# Patient Record
Sex: MALE | Race: WHITE | Employment: FULL TIME | URBAN - NONMETROPOLITAN AREA
[De-identification: names, ages, dates, MRNs, and addresses within clinical notes are randomized per-mention and may not be internally consistent; named-entity substitution may affect disease eponyms.]

---

## 2019-06-19 ENCOUNTER — OFFICE VISIT (OUTPATIENT)
Dept: PRIMARY CARE CLINIC | Age: 54
End: 2019-06-19
Payer: COMMERCIAL

## 2019-06-19 VITALS
BODY MASS INDEX: 25.73 KG/M2 | OXYGEN SATURATION: 95 % | HEIGHT: 72 IN | WEIGHT: 190 LBS | RESPIRATION RATE: 16 BRPM | HEART RATE: 71 BPM | DIASTOLIC BLOOD PRESSURE: 80 MMHG | TEMPERATURE: 98 F | SYSTOLIC BLOOD PRESSURE: 118 MMHG

## 2019-06-19 DIAGNOSIS — E78.2 MIXED HYPERLIPIDEMIA: Primary | ICD-10-CM

## 2019-06-19 PROCEDURE — 99213 OFFICE O/P EST LOW 20 MIN: CPT | Performed by: FAMILY MEDICINE

## 2019-06-19 ASSESSMENT — PATIENT HEALTH QUESTIONNAIRE - PHQ9
1. LITTLE INTEREST OR PLEASURE IN DOING THINGS: 0
SUM OF ALL RESPONSES TO PHQ QUESTIONS 1-9: 0
2. FEELING DOWN, DEPRESSED OR HOPELESS: 0
SUM OF ALL RESPONSES TO PHQ QUESTIONS 1-9: 0
SUM OF ALL RESPONSES TO PHQ9 QUESTIONS 1 & 2: 0

## 2019-06-19 NOTE — PROGRESS NOTES
2019     Heron Wiley    : 1965 Sex: male   Age: 62 y.o. Chief Complaint   Patient presents with    Follow-up     no new problems       HPI: This 62y.o. -year-old male  presents today for evaluation and management of his  chronic medical problems. Current medication list reviewed. The patient is tolerating all medications well without adverse events or known side effects. The patient does understand the risk and benefits of the prescribed medications. The patient is up-to-date on all age-appropriate wellness issues. ROS:   Const: Denies changes in appetite, chills, fever, night sweats and weight loss. Eyes:  Denies discharge, a recent change in visual acuity, blurred vision and double vision. ENMT: Denies discharge of the ears, hearing loss, pain of the ears. Denies nasal or sinus symptoms other than stated above. Denies mouth or throat symptoms. CV:  Denies chest pain, dyspnea on exertion, orthopnea, palpitations and PND  Resp: Denies chest pain, cough, SOB and wheezing. GI: Denies abdominal pain, constipation, diarrhea, heartburn, indigestion, nausea and vomiting. : Denies dysuria, frequency, hematuria, nocturia and urgency. Musculo: Denies arthralgias and myalgia  Skin:  Denies lesions, pruritus and rash. Neuro: Denies dizziness, lightheadedness, numbness, tingling and weakness. Psych:  Denies anxiety and depression  Endocrine: Denies anxiety and depression. Hema/Lymph: Denies hematologic symptoms  Allergy/Immuno:  Denies allergic/immunologic symptoms. Pertinent positives reviewed and noted    No current outpatient medications on file. No Known Allergies    History reviewed. No pertinent past medical history.   Social History     Socioeconomic History    Marital status:      Spouse name: Not on file    Number of children: Not on file    Years of education: Not on file    Highest education level: Not on file   Occupational History    Not on file   Social Needs   

## 2021-07-06 ENCOUNTER — OFFICE VISIT (OUTPATIENT)
Dept: PRIMARY CARE CLINIC | Age: 56
End: 2021-07-06
Payer: COMMERCIAL

## 2021-07-06 VITALS
RESPIRATION RATE: 18 BRPM | HEIGHT: 72 IN | BODY MASS INDEX: 26.82 KG/M2 | SYSTOLIC BLOOD PRESSURE: 132 MMHG | WEIGHT: 198 LBS | DIASTOLIC BLOOD PRESSURE: 68 MMHG | HEART RATE: 74 BPM | TEMPERATURE: 98.3 F | OXYGEN SATURATION: 97 %

## 2021-07-06 DIAGNOSIS — Z72.0 TOBACCO ABUSE: ICD-10-CM

## 2021-07-06 DIAGNOSIS — Z13.220 SCREENING CHOLESTEROL LEVEL: ICD-10-CM

## 2021-07-06 DIAGNOSIS — Z12.5 SCREENING FOR PROSTATE CANCER: ICD-10-CM

## 2021-07-06 DIAGNOSIS — Z00.01 ENCOUNTER FOR GENERAL ADULT MEDICAL EXAMINATION WITH ABNORMAL FINDINGS: Primary | ICD-10-CM

## 2021-07-06 DIAGNOSIS — Z98.890 HISTORY OF BACK SURGERY: ICD-10-CM

## 2021-07-06 PROCEDURE — 99396 PREV VISIT EST AGE 40-64: CPT | Performed by: FAMILY MEDICINE

## 2021-07-06 SDOH — ECONOMIC STABILITY: FOOD INSECURITY: WITHIN THE PAST 12 MONTHS, THE FOOD YOU BOUGHT JUST DIDN'T LAST AND YOU DIDN'T HAVE MONEY TO GET MORE.: NEVER TRUE

## 2021-07-06 SDOH — ECONOMIC STABILITY: FOOD INSECURITY: WITHIN THE PAST 12 MONTHS, YOU WORRIED THAT YOUR FOOD WOULD RUN OUT BEFORE YOU GOT MONEY TO BUY MORE.: NEVER TRUE

## 2021-07-06 ASSESSMENT — PATIENT HEALTH QUESTIONNAIRE - PHQ9
SUM OF ALL RESPONSES TO PHQ9 QUESTIONS 1 & 2: 0
SUM OF ALL RESPONSES TO PHQ QUESTIONS 1-9: 0
2. FEELING DOWN, DEPRESSED OR HOPELESS: 0
1. LITTLE INTEREST OR PLEASURE IN DOING THINGS: 0

## 2021-07-06 ASSESSMENT — ENCOUNTER SYMPTOMS
COUGH: 0
GASTROINTESTINAL NEGATIVE: 1

## 2021-07-06 ASSESSMENT — SOCIAL DETERMINANTS OF HEALTH (SDOH): HOW HARD IS IT FOR YOU TO PAY FOR THE VERY BASICS LIKE FOOD, HOUSING, MEDICAL CARE, AND HEATING?: NOT HARD AT ALL

## 2021-07-06 NOTE — PROGRESS NOTES
21  Mike Sosa : 1965 Sex: male  Age: 60 y.o. Assessment and Plan:  Jeanna Lakhani was seen today for new patient. Diagnoses and all orders for this visit:    Encounter for general adult medical examination with abnormal findings  -     Basic Metabolic Panel; Future  -     Hepatic Function Panel; Future  -     CBC Auto Differential; Future  -     Lipid Panel; Future    History of back surgery    BMI 26.0-26.9,adult  -     Basic Metabolic Panel; Future  -     Hepatic Function Panel; Future  -     CBC Auto Differential; Future  -     Lipid Panel; Future    Screening cholesterol level  -     Basic Metabolic Panel; Future  -     Hepatic Function Panel; Future  -     CBC Auto Differential; Future  -     Lipid Panel; Future    Screening for prostate cancer  -     Basic Metabolic Panel; Future  -     Hepatic Function Panel; Future  -     CBC Auto Differential; Future  -     Lipid Panel; Future  -     PSA screening; Future    Tobacco abuse    Patient is overall stable. He will update routine annual blood work at his earliest convenience. Further recommendations pending results. Patient reports that he is up-to-date with colonoscopy through [facility] approximately 2 to 3 years ago. He states he was told he should just follow-up for repeat screening in 10 years. Patient was encouraged to consider smoking cessation and call for patches anytime he feels like he is ready. Lung cancer screening was discussed, and the patient would like to think about this further before committing at this time. Return in about 1 year (around 2022) for Routine Follow Up. Chief Complaint   Patient presents with    New Patient       HPI  Patient is here today to reestablish care with new provider and for annual exam.  He is overall healthy. He does not take any current medications. He reports no history of any significant medical issues.   He does have a remote history of previous back surgery for which he did quite well and has really no residual issues. Patient is a smoker, 1 pack/day. His wife recently quit with patches. He is interested in trying this, but states he is not quite ready as of today. Patient states he previously tried Chantix, but did not like how he felt on the medication. We also made him aware of Wellbutrin for consideration moving forward. Problem list reviewed and updated in full with patient today as necessary. A comprehensive ROS was negative, except as documented above. Review of Systems   Constitutional: Negative for chills and fever. Respiratory: Negative for cough. Notes some SOB with exertion, blames smoking. Mostly stable. Cardiovascular: Negative for palpitations. Stabbing pains with stress. No exertional pain despite good activity. Gastrointestinal: Negative. Genitourinary: Negative. Psychiatric/Behavioral:        Mood has been alright overall        No current outpatient medications on file. No Known Allergies    Pt's past medical and surgical history were updated as necessary today   Pt's family and social history were updated as necessary today    Works at [employer]; job is stressful  , three kids   Smokes, 1ppd     Vitals:    07/06/21 1309   BP: 132/68   Pulse: 74   Resp: 18   Temp: 98.3 °F (36.8 °C)   TempSrc: Temporal   SpO2: 97%   Weight: 198 lb (89.8 kg)   Height: 6' (1.829 m)       Physical Exam  Constitutional:       Appearance: Normal appearance. HENT:      Head: Normocephalic and atraumatic. Eyes:      Conjunctiva/sclera: Conjunctivae normal.   Cardiovascular:      Rate and Rhythm: Normal rate and regular rhythm. Heart sounds: Normal heart sounds. Pulmonary:      Effort: Pulmonary effort is normal.      Breath sounds: Normal breath sounds. Abdominal:      Palpations: Abdomen is soft. Tenderness: There is no abdominal tenderness.    Musculoskeletal:         General: Normal range of motion. Skin:     General: Skin is warm and dry. Neurological:      General: No focal deficit present. Mental Status: He is alert and oriented to person, place, and time.    Psychiatric:         Mood and Affect: Mood normal.         Behavior: Behavior normal.         Seen By:  Ronaldo Adams MD

## 2021-07-09 DIAGNOSIS — Z00.01 ENCOUNTER FOR GENERAL ADULT MEDICAL EXAMINATION WITH ABNORMAL FINDINGS: ICD-10-CM

## 2021-07-09 DIAGNOSIS — Z13.220 SCREENING CHOLESTEROL LEVEL: ICD-10-CM

## 2021-07-09 DIAGNOSIS — Z12.5 SCREENING FOR PROSTATE CANCER: ICD-10-CM

## 2021-07-09 LAB
ALBUMIN SERPL-MCNC: 4.4 G/DL (ref 3.5–5.2)
ALP BLD-CCNC: 81 U/L (ref 40–129)
ALT SERPL-CCNC: 24 U/L (ref 0–40)
ANION GAP SERPL CALCULATED.3IONS-SCNC: 9 MMOL/L (ref 7–16)
AST SERPL-CCNC: 23 U/L (ref 0–39)
BASOPHILS ABSOLUTE: 0.03 E9/L (ref 0–0.2)
BASOPHILS RELATIVE PERCENT: 0.5 % (ref 0–2)
BILIRUB SERPL-MCNC: 0.6 MG/DL (ref 0–1.2)
BILIRUBIN DIRECT: <0.2 MG/DL (ref 0–0.3)
BILIRUBIN, INDIRECT: NORMAL MG/DL (ref 0–1)
BUN BLDV-MCNC: 12 MG/DL (ref 6–20)
CALCIUM SERPL-MCNC: 9.5 MG/DL (ref 8.6–10.2)
CHLORIDE BLD-SCNC: 106 MMOL/L (ref 98–107)
CHOLESTEROL, TOTAL: 220 MG/DL (ref 0–199)
CO2: 24 MMOL/L (ref 22–29)
CREAT SERPL-MCNC: 0.8 MG/DL (ref 0.7–1.2)
EOSINOPHILS ABSOLUTE: 0.22 E9/L (ref 0.05–0.5)
EOSINOPHILS RELATIVE PERCENT: 3.4 % (ref 0–6)
GFR AFRICAN AMERICAN: >60
GFR NON-AFRICAN AMERICAN: >60 ML/MIN/1.73
GLUCOSE BLD-MCNC: 87 MG/DL (ref 74–99)
HCT VFR BLD CALC: 53.8 % (ref 37–54)
HDLC SERPL-MCNC: 40 MG/DL
HEMOGLOBIN: 17.4 G/DL (ref 12.5–16.5)
IMMATURE GRANULOCYTES #: 0.02 E9/L
IMMATURE GRANULOCYTES %: 0.3 % (ref 0–5)
LDL CHOLESTEROL CALCULATED: 148 MG/DL (ref 0–99)
LYMPHOCYTES ABSOLUTE: 1.51 E9/L (ref 1.5–4)
LYMPHOCYTES RELATIVE PERCENT: 23.1 % (ref 20–42)
MCH RBC QN AUTO: 31.6 PG (ref 26–35)
MCHC RBC AUTO-ENTMCNC: 32.3 % (ref 32–34.5)
MCV RBC AUTO: 97.6 FL (ref 80–99.9)
MONOCYTES ABSOLUTE: 0.63 E9/L (ref 0.1–0.95)
MONOCYTES RELATIVE PERCENT: 9.6 % (ref 2–12)
NEUTROPHILS ABSOLUTE: 4.14 E9/L (ref 1.8–7.3)
NEUTROPHILS RELATIVE PERCENT: 63.1 % (ref 43–80)
PDW BLD-RTO: 13.4 FL (ref 11.5–15)
PLATELET # BLD: 176 E9/L (ref 130–450)
PMV BLD AUTO: 11.8 FL (ref 7–12)
POTASSIUM SERPL-SCNC: 4.8 MMOL/L (ref 3.5–5)
PROSTATE SPECIFIC ANTIGEN: 0.49 NG/ML (ref 0–4)
RBC # BLD: 5.51 E12/L (ref 3.8–5.8)
SODIUM BLD-SCNC: 139 MMOL/L (ref 132–146)
TOTAL PROTEIN: 7.3 G/DL (ref 6.4–8.3)
TRIGL SERPL-MCNC: 158 MG/DL (ref 0–149)
VLDLC SERPL CALC-MCNC: 32 MG/DL
WBC # BLD: 6.6 E9/L (ref 4.5–11.5)

## 2021-08-19 ENCOUNTER — OFFICE VISIT (OUTPATIENT)
Dept: PRIMARY CARE CLINIC | Age: 56
End: 2021-08-19
Payer: COMMERCIAL

## 2021-08-19 VITALS
BODY MASS INDEX: 26.18 KG/M2 | OXYGEN SATURATION: 97 % | HEART RATE: 76 BPM | DIASTOLIC BLOOD PRESSURE: 71 MMHG | SYSTOLIC BLOOD PRESSURE: 120 MMHG | TEMPERATURE: 97.6 F | WEIGHT: 193 LBS

## 2021-08-19 DIAGNOSIS — R19.6 HALITOSIS: Primary | ICD-10-CM

## 2021-08-19 DIAGNOSIS — D58.2 ELEVATED HEMOGLOBIN (HCC): ICD-10-CM

## 2021-08-19 DIAGNOSIS — E78.2 MIXED HYPERLIPIDEMIA: ICD-10-CM

## 2021-08-19 PROCEDURE — 99214 OFFICE O/P EST MOD 30 MIN: CPT | Performed by: FAMILY MEDICINE

## 2021-08-19 RX ORDER — CETIRIZINE HYDROCHLORIDE 10 MG/1
10 TABLET ORAL DAILY
Qty: 30 TABLET | Refills: 3 | Status: SHIPPED
Start: 2021-08-19 | End: 2022-09-13 | Stop reason: ALTCHOICE

## 2021-08-19 RX ORDER — FAMOTIDINE 20 MG/1
20 TABLET, FILM COATED ORAL 2 TIMES DAILY
Qty: 60 TABLET | Refills: 3 | Status: SHIPPED
Start: 2021-08-19 | End: 2022-09-13 | Stop reason: ALTCHOICE

## 2021-08-19 ASSESSMENT — ENCOUNTER SYMPTOMS: RESPIRATORY NEGATIVE: 1

## 2021-08-19 NOTE — PROGRESS NOTES
21  Miki Barton : 1965 Sex: male  Age: 60 y.o. Assessment and Plan:  Trenton Murphy was seen today for discuss labs. Diagnoses and all orders for this visit:    Halitosis  -     famotidine (PEPCID) 20 MG tablet; Take 1 tablet by mouth 2 times daily  -     cetirizine (ZYRTEC) 10 MG tablet; Take 1 tablet by mouth daily    Mixed hyperlipidemia    Elevated hemoglobin (HCC)  -     CBC; Future    Patient will trial both antihistamine and H2 blocker to see if either improve his bad breath. Given that its only at night, we agree it must be related to being supine. Patient will keep me posted as to how these are working. He declines statin medication at this point. Mediterranean diet was recommended. We will plan to reassess in approximately 6 months. Patient is unable to repeat CBC today, but will do this within the next couple of weeks. Further recommendations pending results. Return in about 6 months (around 2022). Chief Complaint   Patient presents with   Myra Claude HPI  Patient is here today at my request to follow-up on his most recent blood work    HLD - LDL elevated  Patient and I had a long conversation regarding whether or not we should start statin medications  Patient indicated he would strongly prefer some time to see if he could improve things with diet alone    We also discussed his elevated hemoglobin. I suspect this is due to his smoking. Patient was strongly encouraged to quit. Patient is also complaining of halitosis. He states that when he goes to bed at night his breath smells fine, but his wife has noted that it changes overnight. Patient reports that he does feel like he might have some sinus drainage, as well as some possible acid reflux.     Labs reviewed in full with patient:  Component      Latest Ref Rng & Units 2021           7:05 AM  7:05 AM  7:05 AM  7:05 AM   WBC      4.5 - 11.5 E9/L       RBC      3.80 - 5.80 E12/L       Hemoglobin Quant      12.5 - 16.5 g/dL       Hematocrit      37.0 - 54.0 %       MCV      80.0 - 99.9 fL       MCH      26.0 - 35.0 pg       MCHC      32.0 - 34.5 %       RDW      11.5 - 15.0 fL       Platelet Count      548 - 450 E9/L       MPV      7.0 - 12.0 fL       Neutrophils %      43.0 - 80.0 %       Immature Granulocytes %      0.0 - 5.0 %       Lymphocyte %      20.0 - 42.0 %       Monocytes %      2.0 - 12.0 %       Eosinophils %      0.0 - 6.0 %       Basophils %      0.0 - 2.0 %       Neutrophils Absolute      1.80 - 7.30 E9/L       Immature Granulocytes #      E9/L       Lymphocytes Absolute      1.50 - 4.00 E9/L       Monocytes Absolute      0.10 - 0.95 E9/L       Eosinophils Absolute      0.05 - 0.50 E9/L       Basophils Absolute      0.00 - 0.20 E9/L       Sodium      132 - 146 mmol/L    139   Potassium      3.5 - 5.0 mmol/L    4.8   Chloride      98 - 107 mmol/L    106   CO2      22 - 29 mmol/L    24   Anion Gap      7 - 16 mmol/L    9   Glucose      74 - 99 mg/dL    87   BUN      6 - 20 mg/dL    12   Creatinine      0.7 - 1.2 mg/dL    0.8   GFR Non-African American      >=60 mL/min/1.73    >60   GFR           >60   Calcium      8.6 - 10.2 mg/dL    9.5   Total Protein      6.4 - 8.3 g/dL   7.3    Albumin      3.5 - 5.2 g/dL   4.4    Alk Phos      40 - 129 U/L   81    ALT      0 - 40 U/L   24    AST      0 - 39 U/L   23    Bilirubin      0.0 - 1.2 mg/dL   0.6    Bilirubin, Direct      0.0 - 0.3 mg/dL   <0.2    Bilirubin, Indirect      0.0 - 1.0 mg/dL   see below    Cholesterol, Total      0 - 199 mg/dL  220 (H)     Triglycerides      0 - 149 mg/dL  158 (H)     HDL Cholesterol      >40 mg/dL  40     LDL Calculated      0 - 99 mg/dL  148 (H)     VLDL Cholesterol Calculated      mg/dL  32     Prostatic Specific Ag      0.00 - 4.00 ng/mL 0.49        Component      Latest Ref Rng & Units 7/9/2021           7:05 AM   WBC      4.5 - 11.5 E9/L 6.6   RBC      3.80 - 5.80 E12/L 5.51   Hemoglobin Quant      12.5 - 16.5 g/dL 17.4 (H)   Hematocrit      37.0 - 54.0 % 53.8   MCV      80.0 - 99.9 fL 97.6   MCH      26.0 - 35.0 pg 31.6   MCHC      32.0 - 34.5 % 32.3   RDW      11.5 - 15.0 fL 13.4   Platelet Count      741 - 450 E9/L 176   MPV      7.0 - 12.0 fL 11.8   Neutrophils %      43.0 - 80.0 % 63.1   Immature Granulocytes %      0.0 - 5.0 % 0.3   Lymphocyte %      20.0 - 42.0 % 23.1   Monocytes %      2.0 - 12.0 % 9.6   Eosinophils %      0.0 - 6.0 % 3.4   Basophils %      0.0 - 2.0 % 0.5   Neutrophils Absolute      1.80 - 7.30 E9/L 4.14   Immature Granulocytes #      E9/L 0.02   Lymphocytes Absolute      1.50 - 4.00 E9/L 1.51   Monocytes Absolute      0.10 - 0.95 E9/L 0.63   Eosinophils Absolute      0.05 - 0.50 E9/L 0.22   Basophils Absolute      0.00 - 0.20 E9/L 0.03   Sodium      132 - 146 mmol/L    Potassium      3.5 - 5.0 mmol/L    Chloride      98 - 107 mmol/L    CO2      22 - 29 mmol/L    Anion Gap      7 - 16 mmol/L    Glucose      74 - 99 mg/dL    BUN      6 - 20 mg/dL    Creatinine      0.7 - 1.2 mg/dL    GFR Non-African American      >=60 mL/min/1.73    GFR African American          Calcium      8.6 - 10.2 mg/dL    Total Protein      6.4 - 8.3 g/dL    Albumin      3.5 - 5.2 g/dL    Alk Phos      40 - 129 U/L    ALT      0 - 40 U/L    AST      0 - 39 U/L    Bilirubin      0.0 - 1.2 mg/dL    Bilirubin, Direct      0.0 - 0.3 mg/dL    Bilirubin, Indirect      0.0 - 1.0 mg/dL    Cholesterol, Total      0 - 199 mg/dL    Triglycerides      0 - 149 mg/dL    HDL Cholesterol      >40 mg/dL    LDL Calculated      0 - 99 mg/dL    VLDL Cholesterol Calculated      mg/dL    Prostatic Specific Ag      0.00 - 4.00 ng/mL        Problem list reviewed and updated in full with patient today as necessary. A comprehensive ROS was negative, except as documented above. Review of Systems   Respiratory: Negative. Cardiovascular: Negative.         Current Outpatient Medications:     famotidine maintenance and to schedule as soon as possible if overdue, as this is important in assessing for undiagnosed pathology, especially cancer, as well as protecting against potentially harmful/life threatening disease. If discussed, any educational materials and/or home exercises printed for patient's review and were included in patient instructions on his/her After Visit Summary and given to patient at the end of visit. Advised patient to call with any new medication issues, and and other concerns/complaints prior to scheduled follow up. All questions answered to the patient's satisfaction.         Seen By:  Wes Dickey MD

## 2021-09-21 ENCOUNTER — OFFICE VISIT (OUTPATIENT)
Dept: FAMILY MEDICINE CLINIC | Age: 56
End: 2021-09-21
Payer: COMMERCIAL

## 2021-09-21 VITALS
DIASTOLIC BLOOD PRESSURE: 70 MMHG | WEIGHT: 188 LBS | HEART RATE: 76 BPM | BODY MASS INDEX: 25.5 KG/M2 | TEMPERATURE: 97.6 F | OXYGEN SATURATION: 98 % | SYSTOLIC BLOOD PRESSURE: 120 MMHG

## 2021-09-21 DIAGNOSIS — R11.0 NAUSEA: Primary | ICD-10-CM

## 2021-09-21 LAB
Lab: NORMAL
PERFORMING INSTRUMENT: NORMAL
QC PASS/FAIL: NORMAL
SARS-COV-2, POC: NORMAL

## 2021-09-21 PROCEDURE — 87426 SARSCOV CORONAVIRUS AG IA: CPT | Performed by: PHYSICIAN ASSISTANT

## 2021-09-21 PROCEDURE — 99203 OFFICE O/P NEW LOW 30 MIN: CPT | Performed by: PHYSICIAN ASSISTANT

## 2021-09-21 ASSESSMENT — ENCOUNTER SYMPTOMS
DIARRHEA: 0
COUGH: 0
SORE THROAT: 0
SHORTNESS OF BREATH: 0
PHOTOPHOBIA: 0
NAUSEA: 1
BACK PAIN: 0
VOMITING: 0
ABDOMINAL PAIN: 0

## 2021-09-21 NOTE — PROGRESS NOTES
21  Lisa Ruvalcaba : 1965 Sex: male  Age 60 y.o. Subjective:  Chief Complaint   Patient presents with    Nausea         78-year-old male presents to the walk-in clinic for a COVID-19 test.  He states last night he had night sweats. He states he woke up this morning with nausea and dry heaves. He states he did not vomit. He denies any diarrhea. No cough or congestion. 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  He is not vaccinated. He denies any shortness of breath or chest pain. He has not taken any over-the-counter medications. He denies any other symptoms or complaints at this time. Review of Systems   Constitutional: Positive for chills and diaphoresis. Negative for fever. HENT: Negative for congestion, ear pain and sore throat. Eyes: Negative for photophobia and visual disturbance. Respiratory: Negative for cough and shortness of breath. Cardiovascular: Negative for chest pain. Gastrointestinal: Positive for nausea. Negative for abdominal pain, diarrhea and vomiting. Genitourinary: Negative for difficulty urinating, dysuria, frequency and urgency. Musculoskeletal: Negative for back pain, neck pain and neck stiffness. Skin: Negative for rash. Neurological: Negative for dizziness, syncope, weakness, light-headedness and headaches. Hematological: Negative for adenopathy. Does not bruise/bleed easily. Psychiatric/Behavioral: Negative for agitation and confusion. All other systems reviewed and are negative. PMH:   History reviewed. No pertinent past medical history. History reviewed. No pertinent surgical history. History reviewed. No pertinent family history.     Medications:     Current Outpatient Medications:     famotidine (PEPCID) 20 MG tablet, Take 1 tablet by mouth 2 times daily, Disp: 60 tablet, Rfl: 3    cetirizine (ZYRTEC) 10 MG tablet, Take 1 tablet by mouth daily, Disp: 30 tablet, Rfl: 3    Allergies:   No Known Allergies    Social History:     Social History     Tobacco Use    Smoking status: Current Every Day Smoker     Packs/day: 1.00     Years: 38.00     Pack years: 38.00     Types: Cigarettes    Smokeless tobacco: Never Used   Substance Use Topics    Alcohol use: Not on file    Drug use: Not on file       Patient lives at home. Physical Exam:     Vitals:    09/21/21 0907   BP: 120/70   Pulse: 76   Temp: 97.6 °F (36.4 °C)   TempSrc: Temporal   SpO2: 98%   Weight: 188 lb (85.3 kg)       Exam:  Physical Exam  Vitals and nursing note reviewed. Constitutional:       General: He is not in acute distress. Appearance: He is well-developed. HENT:      Head: Normocephalic and atraumatic. Right Ear: Tympanic membrane normal.      Left Ear: Tympanic membrane normal.      Nose: Nose normal.      Mouth/Throat:      Mouth: Mucous membranes are moist.      Pharynx: Oropharynx is clear. Eyes:      Conjunctiva/sclera: Conjunctivae normal.      Pupils: Pupils are equal, round, and reactive to light. Cardiovascular:      Rate and Rhythm: Normal rate and regular rhythm. Pulmonary:      Effort: Pulmonary effort is normal. No respiratory distress. Breath sounds: Normal breath sounds. Abdominal:      General: Bowel sounds are normal.      Palpations: Abdomen is soft. Tenderness: There is no abdominal tenderness. Musculoskeletal:         General: Normal range of motion. Cervical back: Normal range of motion. No rigidity. Lymphadenopathy:      Cervical: No cervical adenopathy. Skin:     General: Skin is warm and dry. Neurological:      General: No focal deficit present. Mental Status: He is alert and oriented to person, place, and time. Psychiatric:         Mood and Affect: Mood normal.         Behavior: Behavior normal.         Thought Content:  Thought content normal.         Judgment: Judgment normal.           Testing: Medical Decision Making:     Patient upon arrival did not appear toxic or lethargic. Vital signs were reviewed. Past medical history reviewed. Allergies reviewed. Medications reviewed. Patient is presenting with the above complaint of body aches, fever and congestion. COVID-19 PCR is pending. Differential diagnosis was discussed. At this point he will quarantine until we have the results of his Covid test back. If positive for COVID-19 he is to quarantine for 10 days after the onset of his symptoms as well as remain fever free for 24 hours with his other symptoms improving until he is able to leave quarantine. Patient understands a plan is agreeable. We discussed signs and symptoms that would warrant emergent evaluation the emergency department. Patient will follow up as needed with his PCP. Clinical Impression:   Preethi Flroes was seen today for nausea. Diagnoses and all orders for this visit:    Nausea  -     POCT COVID-19, Antigen  -     COVID-19 Ambulatory; Future        The patient is to call for any concerns or return if any of the signs or symptoms worsen. The patient is to follow-up with PCP in the next 2-3 days for repeat evaluation repeat assessment or go directly to the emergency department. SIGNATURE: Zo Castellon PA-C

## 2022-09-13 ENCOUNTER — OFFICE VISIT (OUTPATIENT)
Dept: PRIMARY CARE CLINIC | Age: 57
End: 2022-09-13
Payer: COMMERCIAL

## 2022-09-13 VITALS
DIASTOLIC BLOOD PRESSURE: 72 MMHG | OXYGEN SATURATION: 96 % | SYSTOLIC BLOOD PRESSURE: 118 MMHG | BODY MASS INDEX: 26.95 KG/M2 | RESPIRATION RATE: 18 BRPM | HEART RATE: 75 BPM | TEMPERATURE: 97.9 F | WEIGHT: 199 LBS | HEIGHT: 72 IN

## 2022-09-13 DIAGNOSIS — M79.605 BILATERAL LEG PAIN: Primary | ICD-10-CM

## 2022-09-13 DIAGNOSIS — R20.0 NUMBNESS AND TINGLING OF LEFT LOWER EXTREMITY: ICD-10-CM

## 2022-09-13 DIAGNOSIS — Z98.890 HISTORY OF BACK SURGERY: ICD-10-CM

## 2022-09-13 DIAGNOSIS — M79.604 BILATERAL LEG PAIN: Primary | ICD-10-CM

## 2022-09-13 DIAGNOSIS — R20.2 NUMBNESS AND TINGLING OF LEFT LOWER EXTREMITY: ICD-10-CM

## 2022-09-13 PROCEDURE — 99213 OFFICE O/P EST LOW 20 MIN: CPT | Performed by: FAMILY MEDICINE

## 2022-09-13 SDOH — ECONOMIC STABILITY: FOOD INSECURITY: WITHIN THE PAST 12 MONTHS, YOU WORRIED THAT YOUR FOOD WOULD RUN OUT BEFORE YOU GOT MONEY TO BUY MORE.: NEVER TRUE

## 2022-09-13 SDOH — ECONOMIC STABILITY: FOOD INSECURITY: WITHIN THE PAST 12 MONTHS, THE FOOD YOU BOUGHT JUST DIDN'T LAST AND YOU DIDN'T HAVE MONEY TO GET MORE.: NEVER TRUE

## 2022-09-13 ASSESSMENT — ANXIETY QUESTIONNAIRES
7. FEELING AFRAID AS IF SOMETHING AWFUL MIGHT HAPPEN: 0
5. BEING SO RESTLESS THAT IT IS HARD TO SIT STILL: 0
6. BECOMING EASILY ANNOYED OR IRRITABLE: 0
3. WORRYING TOO MUCH ABOUT DIFFERENT THINGS: 0
1. FEELING NERVOUS, ANXIOUS, OR ON EDGE: 0
1. FEELING NERVOUS, ANXIOUS, OR ON EDGE: NOT AT ALL
6. BECOMING EASILY ANNOYED OR IRRITABLE: NOT AT ALL
2. NOT BEING ABLE TO STOP OR CONTROL WORRYING: 0
2. NOT BEING ABLE TO STOP OR CONTROL WORRYING: NOT AT ALL
4. TROUBLE RELAXING: 0
IF YOU CHECKED OFF ANY PROBLEMS ON THIS QUESTIONNAIRE, HOW DIFFICULT HAVE THESE PROBLEMS MADE IT FOR YOU TO DO YOUR WORK, TAKE CARE OF THINGS AT HOME, OR GET ALONG WITH OTHER PEOPLE: NOT DIFFICULT AT ALL
GAD7 TOTAL SCORE: 0
IF YOU CHECKED OFF ANY PROBLEMS ON THIS QUESTIONNAIRE, HOW DIFFICULT HAVE THESE PROBLEMS MADE IT FOR YOU TO DO YOUR WORK, TAKE CARE OF THINGS AT HOME, OR GET ALONG WITH OTHER PEOPLE: NOT DIFFICULT AT ALL
3. WORRYING TOO MUCH ABOUT DIFFERENT THINGS: NOT AT ALL
7. FEELING AFRAID AS IF SOMETHING AWFUL MIGHT HAPPEN: NOT AT ALL
5. BEING SO RESTLESS THAT IT IS HARD TO SIT STILL: NOT AT ALL
4. TROUBLE RELAXING: NOT AT ALL

## 2022-09-13 ASSESSMENT — PATIENT HEALTH QUESTIONNAIRE - PHQ9
SUM OF ALL RESPONSES TO PHQ9 QUESTIONS 1 & 2: 0
SUM OF ALL RESPONSES TO PHQ QUESTIONS 1-9: 0
1. LITTLE INTEREST OR PLEASURE IN DOING THINGS: 0
2. FEELING DOWN, DEPRESSED OR HOPELESS: 0

## 2022-09-13 ASSESSMENT — LIFESTYLE VARIABLES
HOW OFTEN DO YOU HAVE SIX OR MORE DRINKS ON ONE OCCASION: 1
HOW OFTEN DO YOU HAVE A DRINK CONTAINING ALCOHOL: 3
HOW OFTEN DO YOU HAVE A DRINK CONTAINING ALCOHOL: 2-4 TIMES A MONTH
HOW MANY STANDARD DRINKS CONTAINING ALCOHOL DO YOU HAVE ON A TYPICAL DAY: 2
HOW MANY STANDARD DRINKS CONTAINING ALCOHOL DO YOU HAVE ON A TYPICAL DAY: 3 OR 4

## 2022-09-13 ASSESSMENT — SOCIAL DETERMINANTS OF HEALTH (SDOH): HOW HARD IS IT FOR YOU TO PAY FOR THE VERY BASICS LIKE FOOD, HOUSING, MEDICAL CARE, AND HEATING?: NOT VERY HARD

## 2022-09-13 NOTE — PROGRESS NOTES
22  Braulio Correa : 1965 Sex: male  Age: 59 y.o. Assessment and Plan:  Noy Lino was seen today for leg pain and numbness. Diagnoses and all orders for this visit:    Bilateral leg pain  -     XR LUMBAR SPINE (2-3 VIEWS); Future    History of back surgery  -     XR LUMBAR SPINE (2-3 VIEWS); Future    Numbness and tingling of left lower extremity  -     XR LUMBAR SPINE (2-3 VIEWS); Future    Other orders  -     XR LUMBAR SPINE (2-3 VIEWS)    Check xray lumbar spine  Anticipate need MRI for further eval, and/or referral to back specialists  Will order pending review of results     Return in about 8 weeks (around 2022). 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 A comprehensive ROS was negative, except as documented above. No current outpatient medications on file.   No Known Allergies    Pt's past medical and surgical history were reviewed and updated as necessary today   Pt's family and social history were reviewed and updated as necessary today          Vitals:    22 1553   BP: 118/72   Pulse: 75   Resp: 18   Temp: 97.9 °F (36.6 °C)   TempSrc: Temporal   SpO2: 96%   Weight: 199 lb (90.3 kg)   Height: 6' (1.829 m)       Physical Exam  Constitutional:       Appearance: Normal appearance. HENT:      Head: Normocephalic and atraumatic. Eyes:      Conjunctiva/sclera: Conjunctivae normal.   Cardiovascular:      Rate and Rhythm: Normal rate and regular rhythm. Heart sounds: Normal heart sounds. Pulmonary:      Effort: Pulmonary effort is normal.      Breath sounds: Normal breath sounds. Abdominal:      Palpations: Abdomen is soft. Tenderness: There is no abdominal tenderness. Skin:     General: Skin is warm and dry. Neurological:      General: No focal deficit present. Mental Status: He is alert and oriented to person, place, and time. Psychiatric:         Mood and Affect: Mood normal.         Behavior: Behavior normal.     Counseled patient as appropriate and relevant regarding above diagnosis, including possible risks and complications, especially if left uncontrolled. Counseled patient as appropriate and relevant regarding any  possible side effects, risks, and alternatives to treatment; patient and/or guardian verbalizes understanding, and is in agreement with the plan as detailed above. Reviewed age and gender appropriate health screening exams and vaccinations. Advised patient regarding importance of keeping up with recommended health maintenance and to schedule as soon as possible if overdue, as this is important in assessing for undiagnosed pathology, especially cancer, as well as protecting against potentially harmful/life threatening disease. If discussed, any educational materials and/or home exercises printed for patient's review and were included in patient instructions on his/her After Visit Summary and given to patient at the end of visit. Advised patient to call with any new medication issues, and and other concerns/complaints prior to scheduled follow up. All questions answered to the patient's satisfaction.         Seen By:  Osei Quintero MD

## 2022-09-20 DIAGNOSIS — M79.604 BILATERAL LEG PAIN: ICD-10-CM

## 2022-09-20 DIAGNOSIS — M79.605 BILATERAL LEG PAIN: ICD-10-CM

## 2022-09-20 DIAGNOSIS — Z98.890 HISTORY OF BACK SURGERY: ICD-10-CM

## 2022-09-20 DIAGNOSIS — R20.2 NUMBNESS AND TINGLING OF LEFT LOWER EXTREMITY: Primary | ICD-10-CM

## 2022-09-20 DIAGNOSIS — R20.0 NUMBNESS AND TINGLING OF LEFT LOWER EXTREMITY: Primary | ICD-10-CM

## 2022-10-20 ENCOUNTER — HOSPITAL ENCOUNTER (OUTPATIENT)
Age: 57
Discharge: HOME | End: 2022-10-22
Payer: COMMERCIAL

## 2022-10-20 ENCOUNTER — HOSPITAL ENCOUNTER (OUTPATIENT)
Dept: GENERAL RADIOLOGY | Age: 57
Discharge: HOME | End: 2022-10-22
Payer: COMMERCIAL

## 2022-10-20 ENCOUNTER — OFFICE VISIT (OUTPATIENT)
Dept: NEUROSURGERY | Age: 57
End: 2022-10-20
Payer: COMMERCIAL

## 2022-10-20 VITALS
WEIGHT: 199 LBS | HEIGHT: 72 IN | TEMPERATURE: 98 F | OXYGEN SATURATION: 96 % | BODY MASS INDEX: 26.95 KG/M2 | SYSTOLIC BLOOD PRESSURE: 150 MMHG | RESPIRATION RATE: 18 BRPM | HEART RATE: 78 BPM | DIASTOLIC BLOOD PRESSURE: 77 MMHG

## 2022-10-20 DIAGNOSIS — M54.16 LUMBAR RADICULOPATHY: ICD-10-CM

## 2022-10-20 DIAGNOSIS — Z98.890 HISTORY OF BACK SURGERY: ICD-10-CM

## 2022-10-20 DIAGNOSIS — M54.16 LUMBAR RADICULOPATHY: Primary | ICD-10-CM

## 2022-10-20 PROCEDURE — 72120 X-RAY BEND ONLY L-S SPINE: CPT

## 2022-10-20 PROCEDURE — 73521 X-RAY EXAM HIPS BI 2 VIEWS: CPT

## 2022-10-20 PROCEDURE — 6360000002 HC RX W HCPCS

## 2022-10-20 PROCEDURE — 99203 OFFICE O/P NEW LOW 30 MIN: CPT | Performed by: PHYSICIAN ASSISTANT

## 2022-10-20 RX ORDER — METHYLPREDNISOLONE 4 MG/1
TABLET ORAL
Qty: 1 KIT | Refills: 0 | Status: SHIPPED | OUTPATIENT
Start: 2022-10-20 | End: 2022-10-26

## 2022-10-20 ASSESSMENT — ENCOUNTER SYMPTOMS
TROUBLE SWALLOWING: 0
BACK PAIN: 1
ABDOMINAL PAIN: 0
PHOTOPHOBIA: 0
SHORTNESS OF BREATH: 0

## 2022-10-20 NOTE — PROGRESS NOTES
Subjective:      Patient ID: Sade Madrid is a 59 y.o. male. Sade Madrid is a 59year old male who has a past medical history of previous lumbar surgery 28 years ago. Pt smokes 1ppd. He presents to the office today as a new patient c/o low back pain with radiation into his hips. Also c/o left leg numbness and burning stopping at the knee. Symptoms have been present for several months and are constant. Standing for long periods of time makes the pain worse. He takes ibuprofen without significant relief. Denies recent PT or MACI. Denies loss of bowel or bladder, saddle anesthesia, headache, loss of dexterity, abnormal gait, fever, chills, N/V, SOB, or chest pain. Review of Systems   Constitutional:  Negative for fever and unexpected weight change. HENT:  Negative for trouble swallowing. Eyes:  Negative for photophobia and visual disturbance. Respiratory:  Negative for shortness of breath. Cardiovascular:  Negative for chest pain. Gastrointestinal:  Negative for abdominal pain. Endocrine: Negative for heat intolerance. Genitourinary:  Negative for flank pain. Musculoskeletal:  Positive for back pain and gait problem. Negative for myalgias and neck pain. Skin:  Negative for wound. Neurological:  Positive for numbness. Negative for weakness and headaches. Psychiatric/Behavioral:  Negative for confusion. Objective:   Physical Exam  Constitutional:       Appearance: Normal appearance. He is well-developed. HENT:      Head: Normocephalic and atraumatic. Eyes:      Extraocular Movements: Extraocular movements intact. Conjunctiva/sclera: Conjunctivae normal.      Pupils: Pupils are equal, round, and reactive to light. Cardiovascular:      Rate and Rhythm: Normal rate. Pulmonary:      Effort: Pulmonary effort is normal.   Abdominal:      General: There is no distension. Musculoskeletal:      Cervical back: Normal range of motion and neck supple.    Skin:     General: Skin is warm and dry. Neurological:      Mental Status: He is alert. Comments: Alert and oriented x3  CN3-12 intact  Motor strength full  Sensation intact to light touch     Psychiatric:         Thought Content: Thought content normal.       Assessment: This is a 59 year old male presenting for hip pain and left leg numbness. Hx lumbar surgery.        Plan:      -Obtain MRI lumbar spine to evaluate for stenosis   -Lumbar flex/ex and hip/pelvis XR ordered  -EMG BLE  -Medrol dosepack sent to [address] [place] [address] report reviewed   -Return to Neurosurgery clinic after completion of imaging to discuss results and further treatment plan  -Call/return sooner if symptoms worsen or new issues arise in the interim           Ramakrishna Heath PA-C

## 2022-11-08 ENCOUNTER — OFFICE VISIT (OUTPATIENT)
Dept: PRIMARY CARE CLINIC | Age: 57
End: 2022-11-08
Payer: COMMERCIAL

## 2022-11-08 VITALS
OXYGEN SATURATION: 94 % | HEIGHT: 72 IN | WEIGHT: 205 LBS | RESPIRATION RATE: 16 BRPM | HEART RATE: 70 BPM | BODY MASS INDEX: 27.77 KG/M2 | SYSTOLIC BLOOD PRESSURE: 130 MMHG | TEMPERATURE: 98.7 F | DIASTOLIC BLOOD PRESSURE: 80 MMHG

## 2022-11-08 DIAGNOSIS — Z12.5 PROSTATE CANCER SCREENING: ICD-10-CM

## 2022-11-08 DIAGNOSIS — E78.2 MIXED HYPERLIPIDEMIA: Primary | ICD-10-CM

## 2022-11-08 DIAGNOSIS — M79.604 BILATERAL LEG PAIN: ICD-10-CM

## 2022-11-08 DIAGNOSIS — M79.605 BILATERAL LEG PAIN: ICD-10-CM

## 2022-11-08 DIAGNOSIS — Z98.890 HISTORY OF BACK SURGERY: ICD-10-CM

## 2022-11-08 DIAGNOSIS — Z72.0 TOBACCO ABUSE: ICD-10-CM

## 2022-11-08 PROCEDURE — 99213 OFFICE O/P EST LOW 20 MIN: CPT | Performed by: FAMILY MEDICINE

## 2022-11-08 RX ORDER — IBUPROFEN 600 MG/1
600 TABLET ORAL 3 TIMES DAILY PRN
Qty: 30 TABLET | Refills: 0 | Status: SHIPPED | OUTPATIENT
Start: 2022-11-08

## 2022-11-08 NOTE — PROGRESS NOTES
22  Walt Gallo : 1965 Sex: male  Age: 59 y.o. Assessment and Plan:  Sujey Kulkarni was seen today for other. Diagnoses and all orders for this visit:    Mixed hyperlipidemia  -     Basic Metabolic Panel; Future  -     Hepatic Function Panel; Future  -     CBC with Auto Differential; Future  -     Lipid Panel; Future    Tobacco abuse    History of back surgery    Bilateral leg pain  -     ibuprofen (ADVIL;MOTRIN) 600 MG tablet; Take 1 tablet by mouth 3 times daily as needed for Pain    Prostate cancer screening  -     PSA Screening; Future      Overall stable  Ibuprofen PRN  Update routine BW as necessary when due  Further recommendations pending results  F/u for MRI - he will call about this today     Declines flu shot       Return in about 6 months (around 2023). Chief Complaint   Patient presents with    Other       HPI  Pt here for f/u leg pains      Lumbar spine, two-view:      1. Severe osteoarthritic changes lower lumbar spine. Pt saw neurosurg   They did start him on steroids  This seems to have helped somewhat   Left leg numbness has reduced   MRI ordered but not yet scheduled  Pt wondering what he can take as needed if he knows he'll be active and will flare his pain  Discussed anti-inflammatories PRN with steroids only for major events     Otherwise doing well  No other acute concerns today   Due for annual labs     Problem list reviewed and updated in full with patient today as necessary. A comprehensive ROS was negative, except as documented above.        Current Outpatient Medications:     ibuprofen (ADVIL;MOTRIN) 600 MG tablet, Take 1 tablet by mouth 3 times daily as needed for Pain, Disp: 30 tablet, Rfl: 0  Allergies   Allergen Reactions    Unable To Assess Hives       Pt's past medical and surgical history were reviewed and updated as necessary today   Pt's family and social history were reviewed and updated as necessary today    Smokes 1ppd  Declines lung cancer screening       Vitals:    11/08/22 0802   BP: 130/80   Pulse: 70   Resp: 16   Temp: 98.7 °F (37.1 °C)   SpO2: 94%   Weight: 205 lb (93 kg)   Height: 6' (1.829 m)       Physical Exam  Constitutional:       Appearance: Normal appearance. HENT:      Head: Normocephalic and atraumatic. Eyes:      Conjunctiva/sclera: Conjunctivae normal.   Cardiovascular:      Rate and Rhythm: Normal rate and regular rhythm. Heart sounds: Normal heart sounds. Pulmonary:      Effort: Pulmonary effort is normal.      Breath sounds: Normal breath sounds. Abdominal:      Palpations: Abdomen is soft. Tenderness: There is no abdominal tenderness. Musculoskeletal:         General: Normal range of motion. Skin:     General: Skin is warm and dry. Neurological:      General: No focal deficit present. Mental Status: He is alert and oriented to person, place, and time. Psychiatric:         Mood and Affect: Mood normal.         Behavior: Behavior normal.     Counseled patient as appropriate and relevant regarding above diagnosis, including possible risks and complications, especially if left uncontrolled. Counseled patient as appropriate and relevant regarding any  possible side effects, risks, and alternatives to treatment; patient and/or guardian verbalizes understanding, and is in agreement with the plan as detailed above. Reviewed age and gender appropriate health screening exams and vaccinations. Advised patient regarding importance of keeping up with recommended health maintenance and to schedule as soon as possible if overdue, as this is important in assessing for undiagnosed pathology, especially cancer, as well as protecting against potentially harmful/life threatening disease. If discussed, any educational materials and/or home exercises printed for patient's review and were included in patient instructions on his/her After Visit Summary and given to patient at the end of visit. Advised patient to call with any new medication issues, and and other concerns/complaints prior to scheduled follow up. All questions answered to the patient's satisfaction.         Seen By:  Arielal Conklin MD

## 2023-04-24 DIAGNOSIS — M79.605 BILATERAL LEG PAIN: ICD-10-CM

## 2023-04-24 DIAGNOSIS — M79.604 BILATERAL LEG PAIN: ICD-10-CM

## 2023-04-24 RX ORDER — IBUPROFEN 600 MG/1
600 TABLET ORAL 3 TIMES DAILY PRN
Qty: 30 TABLET | Refills: 0 | Status: SHIPPED
Start: 2023-04-24 | End: 2023-04-26 | Stop reason: SDUPTHER

## 2023-04-24 NOTE — TELEPHONE ENCOUNTER
Last Appointment:  11/8/2022  Future Appointments   Date Time Provider Sregio Ortiz   5/9/2023  7:00 AM Malvin Meyer MD [facility]

## 2023-04-24 NOTE — TELEPHONE ENCOUNTER
ibuprofen (ADVIL;MOTRIN) 600 MG tablet   [address] [facility], [address] [place] [address] [phone]   [address] [place] [address]   Phone:  [phone]  Fax:  [phone]

## 2023-04-26 DIAGNOSIS — M79.605 BILATERAL LEG PAIN: ICD-10-CM

## 2023-04-26 DIAGNOSIS — M79.604 BILATERAL LEG PAIN: ICD-10-CM

## 2023-04-26 RX ORDER — IBUPROFEN 600 MG/1
600 TABLET ORAL 3 TIMES DAILY PRN
Qty: 30 TABLET | Refills: 0 | Status: SHIPPED | OUTPATIENT
Start: 2023-04-26

## 2023-04-26 NOTE — TELEPHONE ENCOUNTER
Last Appointment:  11/8/2022  Future Appointments   Date Time Provider Sergio Ortiz   5/9/2023  7:00 AM Jeff Fox MD [facility]

## 2023-05-09 ENCOUNTER — OFFICE VISIT (OUTPATIENT)
Dept: PRIMARY CARE CLINIC | Age: 58
End: 2023-05-09
Payer: COMMERCIAL

## 2023-05-09 VITALS
DIASTOLIC BLOOD PRESSURE: 74 MMHG | WEIGHT: 208 LBS | RESPIRATION RATE: 16 BRPM | HEIGHT: 72 IN | OXYGEN SATURATION: 95 % | BODY MASS INDEX: 28.17 KG/M2 | SYSTOLIC BLOOD PRESSURE: 130 MMHG | HEART RATE: 74 BPM | TEMPERATURE: 98.7 F

## 2023-05-09 DIAGNOSIS — E78.2 MIXED HYPERLIPIDEMIA: ICD-10-CM

## 2023-05-09 DIAGNOSIS — Z00.01 ENCOUNTER FOR GENERAL ADULT MEDICAL EXAMINATION WITH ABNORMAL FINDINGS: Primary | ICD-10-CM

## 2023-05-09 DIAGNOSIS — D58.2 ELEVATED HEMOGLOBIN (HCC): ICD-10-CM

## 2023-05-09 DIAGNOSIS — Z72.0 TOBACCO ABUSE: ICD-10-CM

## 2023-05-09 DIAGNOSIS — M79.604 BILATERAL LEG PAIN: ICD-10-CM

## 2023-05-09 DIAGNOSIS — M79.605 BILATERAL LEG PAIN: ICD-10-CM

## 2023-05-09 DIAGNOSIS — Z12.5 PROSTATE CANCER SCREENING: ICD-10-CM

## 2023-05-09 LAB
ALBUMIN SERPL-MCNC: 4.5 G/DL (ref 3.5–5.2)
ALP SERPL-CCNC: 83 U/L (ref 40–129)
ALT SERPL-CCNC: 22 U/L (ref 0–40)
ANION GAP SERPL CALCULATED.3IONS-SCNC: 15 MMOL/L (ref 7–16)
AST SERPL-CCNC: 19 U/L (ref 0–39)
BASOPHILS # BLD: 0.04 E9/L (ref 0–0.2)
BASOPHILS NFR BLD: 0.7 % (ref 0–2)
BILIRUB DIRECT SERPL-MCNC: <0.2 MG/DL (ref 0–0.3)
BILIRUB INDIRECT SERPL-MCNC: NORMAL MG/DL (ref 0–1)
BILIRUB SERPL-MCNC: 0.5 MG/DL (ref 0–1.2)
BUN SERPL-MCNC: 18 MG/DL (ref 6–23)
CALCIUM SERPL-MCNC: 10.2 MG/DL (ref 8.6–10.2)
CHLORIDE SERPL-SCNC: 105 MMOL/L (ref 98–107)
CHOLESTEROL, TOTAL: 244 MG/DL (ref 0–199)
CO2 SERPL-SCNC: 20 MMOL/L (ref 22–29)
CREAT SERPL-MCNC: 0.9 MG/DL (ref 0.7–1.2)
EOSINOPHIL # BLD: 0.17 E9/L (ref 0.05–0.5)
EOSINOPHIL NFR BLD: 2.8 % (ref 0–6)
ERYTHROCYTE [DISTWIDTH] IN BLOOD BY AUTOMATED COUNT: 13.3 FL (ref 11.5–15)
GLUCOSE SERPL-MCNC: 95 MG/DL (ref 74–99)
HCT VFR BLD AUTO: 53.6 % (ref 37–54)
HDLC SERPL-MCNC: 44 MG/DL
HGB BLD-MCNC: 17.5 G/DL (ref 12.5–16.5)
IMM GRANULOCYTES # BLD: 0.01 E9/L
IMM GRANULOCYTES NFR BLD: 0.2 % (ref 0–5)
LDLC SERPL CALC-MCNC: 170 MG/DL (ref 0–99)
LYMPHOCYTES # BLD: 1.37 E9/L (ref 1.5–4)
LYMPHOCYTES NFR BLD: 22.6 % (ref 20–42)
MCH RBC QN AUTO: 32.2 PG (ref 26–35)
MCHC RBC AUTO-ENTMCNC: 32.6 % (ref 32–34.5)
MCV RBC AUTO: 98.7 FL (ref 80–99.9)
MONOCYTES # BLD: 0.63 E9/L (ref 0.1–0.95)
MONOCYTES NFR BLD: 10.4 % (ref 2–12)
NEUTROPHILS # BLD: 3.83 E9/L (ref 1.8–7.3)
NEUTS SEG NFR BLD: 63.3 % (ref 43–80)
PLATELET # BLD AUTO: 160 E9/L (ref 130–450)
PMV BLD AUTO: 11.6 FL (ref 7–12)
POTASSIUM SERPL-SCNC: 4.1 MMOL/L (ref 3.5–5)
PROT SERPL-MCNC: 7.3 G/DL (ref 6.4–8.3)
PSA SERPL-MCNC: 0.64 NG/ML (ref 0–4)
RBC # BLD AUTO: 5.43 E12/L (ref 3.8–5.8)
SODIUM SERPL-SCNC: 140 MMOL/L (ref 132–146)
TRIGL SERPL-MCNC: 151 MG/DL (ref 0–149)
VLDLC SERPL CALC-MCNC: 30 MG/DL
WBC # BLD: 6.1 E9/L (ref 4.5–11.5)

## 2023-05-09 PROCEDURE — 99396 PREV VISIT EST AGE 40-64: CPT | Performed by: FAMILY MEDICINE

## 2023-05-09 RX ORDER — IBUPROFEN 600 MG/1
600 TABLET ORAL 3 TIMES DAILY PRN
Qty: 30 TABLET | Refills: 2 | Status: SHIPPED | OUTPATIENT
Start: 2023-05-09

## 2023-05-09 SDOH — ECONOMIC STABILITY: FOOD INSECURITY: WITHIN THE PAST 12 MONTHS, THE FOOD YOU BOUGHT JUST DIDN'T LAST AND YOU DIDN'T HAVE MONEY TO GET MORE.: NEVER TRUE

## 2023-05-09 SDOH — ECONOMIC STABILITY: FOOD INSECURITY: WITHIN THE PAST 12 MONTHS, YOU WORRIED THAT YOUR FOOD WOULD RUN OUT BEFORE YOU GOT MONEY TO BUY MORE.: NEVER TRUE

## 2023-05-09 SDOH — ECONOMIC STABILITY: HOUSING INSECURITY
IN THE LAST 12 MONTHS, WAS THERE A TIME WHEN YOU DID NOT HAVE A STEADY PLACE TO SLEEP OR SLEPT IN A SHELTER (INCLUDING NOW)?: NO

## 2023-05-09 SDOH — ECONOMIC STABILITY: INCOME INSECURITY: HOW HARD IS IT FOR YOU TO PAY FOR THE VERY BASICS LIKE FOOD, HOUSING, MEDICAL CARE, AND HEATING?: NOT VERY HARD

## 2023-05-09 ASSESSMENT — ENCOUNTER SYMPTOMS
RESPIRATORY NEGATIVE: 1
GASTROINTESTINAL NEGATIVE: 1

## 2023-05-09 ASSESSMENT — PATIENT HEALTH QUESTIONNAIRE - PHQ9
2. FEELING DOWN, DEPRESSED OR HOPELESS: 0
SUM OF ALL RESPONSES TO PHQ QUESTIONS 1-9: 0
SUM OF ALL RESPONSES TO PHQ9 QUESTIONS 1 & 2: 0
1. LITTLE INTEREST OR PLEASURE IN DOING THINGS: 0

## 2023-05-16 RX ORDER — ROSUVASTATIN CALCIUM 20 MG/1
20 TABLET, COATED ORAL NIGHTLY
Qty: 30 TABLET | Refills: 3 | Status: SHIPPED | OUTPATIENT
Start: 2023-05-16

## 2023-07-31 ENCOUNTER — OFFICE VISIT (OUTPATIENT)
Dept: FAMILY MEDICINE CLINIC | Age: 58
End: 2023-07-31
Payer: COMMERCIAL

## 2023-07-31 VITALS
DIASTOLIC BLOOD PRESSURE: 98 MMHG | OXYGEN SATURATION: 96 % | BODY MASS INDEX: 28.17 KG/M2 | TEMPERATURE: 98.3 F | SYSTOLIC BLOOD PRESSURE: 154 MMHG | HEIGHT: 72 IN | WEIGHT: 208 LBS | HEART RATE: 76 BPM | RESPIRATION RATE: 18 BRPM

## 2023-07-31 DIAGNOSIS — J01.10 ACUTE NON-RECURRENT FRONTAL SINUSITIS: Primary | ICD-10-CM

## 2023-07-31 PROCEDURE — 99213 OFFICE O/P EST LOW 20 MIN: CPT | Performed by: NURSE PRACTITIONER

## 2023-07-31 RX ORDER — AMOXICILLIN AND CLAVULANATE POTASSIUM 875; 125 MG/1; MG/1
1 TABLET, FILM COATED ORAL 2 TIMES DAILY
Qty: 14 TABLET | Refills: 0 | Status: SHIPPED | OUTPATIENT
Start: 2023-07-31 | End: 2023-08-07

## 2023-09-15 DIAGNOSIS — M79.604 BILATERAL LEG PAIN: ICD-10-CM

## 2023-09-15 DIAGNOSIS — M79.605 BILATERAL LEG PAIN: ICD-10-CM

## 2023-09-15 RX ORDER — IBUPROFEN 600 MG/1
600 TABLET ORAL 3 TIMES DAILY PRN
Qty: 30 TABLET | Refills: 2 | Status: SHIPPED | OUTPATIENT
Start: 2023-09-15

## 2023-09-15 NOTE — TELEPHONE ENCOUNTER
Name of Medication(s) Requested:  ibuprofen    Pharmacy Requested:   [facility]    Medication(s) pended? [x] Yes  [] No    Last Appointment:  5/9/2023    Future appts:  Future Appointments   Date Time Provider [address]   11/13/2023  7:00 AM Natali Newman MD [facility]          Does patient need call back?   [] Yes  [x] No

## 2023-11-13 ENCOUNTER — OFFICE VISIT (OUTPATIENT)
Dept: PRIMARY CARE CLINIC | Age: 58
End: 2023-11-13
Payer: COMMERCIAL

## 2023-11-13 VITALS
WEIGHT: 208.8 LBS | TEMPERATURE: 98.8 F | HEART RATE: 71 BPM | DIASTOLIC BLOOD PRESSURE: 82 MMHG | HEIGHT: 72 IN | OXYGEN SATURATION: 98 % | SYSTOLIC BLOOD PRESSURE: 130 MMHG | BODY MASS INDEX: 28.28 KG/M2

## 2023-11-13 DIAGNOSIS — Z72.0 TOBACCO ABUSE: ICD-10-CM

## 2023-11-13 DIAGNOSIS — E78.2 MIXED HYPERLIPIDEMIA: Primary | ICD-10-CM

## 2023-11-13 PROCEDURE — 99213 OFFICE O/P EST LOW 20 MIN: CPT | Performed by: FAMILY MEDICINE

## 2024-05-13 ENCOUNTER — OFFICE VISIT (OUTPATIENT)
Dept: PRIMARY CARE CLINIC | Age: 59
End: 2024-05-13
Payer: COMMERCIAL

## 2024-05-13 VITALS
DIASTOLIC BLOOD PRESSURE: 70 MMHG | TEMPERATURE: 98.9 F | HEART RATE: 70 BPM | HEIGHT: 72 IN | WEIGHT: 206 LBS | SYSTOLIC BLOOD PRESSURE: 110 MMHG | OXYGEN SATURATION: 95 % | BODY MASS INDEX: 27.9 KG/M2

## 2024-05-13 DIAGNOSIS — Z11.59 NEED FOR HEPATITIS C SCREENING TEST: ICD-10-CM

## 2024-05-13 DIAGNOSIS — Z00.01 ENCOUNTER FOR GENERAL ADULT MEDICAL EXAMINATION WITH ABNORMAL FINDINGS: ICD-10-CM

## 2024-05-13 DIAGNOSIS — Z12.5 PROSTATE CANCER SCREENING: ICD-10-CM

## 2024-05-13 DIAGNOSIS — E78.00 PURE HYPERCHOLESTEROLEMIA: ICD-10-CM

## 2024-05-13 DIAGNOSIS — D58.2 ELEVATED HEMOGLOBIN (HCC): ICD-10-CM

## 2024-05-13 DIAGNOSIS — M54.6 CHRONIC BILATERAL THORACIC BACK PAIN: ICD-10-CM

## 2024-05-13 DIAGNOSIS — Z11.4 SCREENING FOR HIV (HUMAN IMMUNODEFICIENCY VIRUS): ICD-10-CM

## 2024-05-13 DIAGNOSIS — M54.50 CHRONIC BILATERAL LOW BACK PAIN WITHOUT SCIATICA: ICD-10-CM

## 2024-05-13 DIAGNOSIS — Z00.01 ENCOUNTER FOR GENERAL ADULT MEDICAL EXAMINATION WITH ABNORMAL FINDINGS: Primary | ICD-10-CM

## 2024-05-13 DIAGNOSIS — G89.29 CHRONIC BILATERAL THORACIC BACK PAIN: ICD-10-CM

## 2024-05-13 DIAGNOSIS — G89.29 CHRONIC BILATERAL LOW BACK PAIN WITHOUT SCIATICA: ICD-10-CM

## 2024-05-13 LAB
ALBUMIN: 4.5 G/DL (ref 3.5–5.2)
ALP BLD-CCNC: 84 U/L (ref 40–129)
ALT SERPL-CCNC: 17 U/L (ref 0–40)
ANION GAP SERPL CALCULATED.3IONS-SCNC: 15 MMOL/L (ref 7–16)
AST SERPL-CCNC: 20 U/L (ref 0–39)
BASOPHILS ABSOLUTE: 0.04 K/UL (ref 0–0.2)
BASOPHILS RELATIVE PERCENT: 1 % (ref 0–2)
BILIRUB SERPL-MCNC: 0.5 MG/DL (ref 0–1.2)
BILIRUBIN DIRECT: <0.2 MG/DL (ref 0–0.3)
BILIRUBIN, INDIRECT: NORMAL MG/DL (ref 0–1)
BUN BLDV-MCNC: 15 MG/DL (ref 6–23)
CALCIUM SERPL-MCNC: 9.8 MG/DL (ref 8.6–10.2)
CHLORIDE BLD-SCNC: 103 MMOL/L (ref 98–107)
CHOLESTEROL, TOTAL: 215 MG/DL
CO2: 20 MMOL/L (ref 22–29)
CREAT SERPL-MCNC: 0.8 MG/DL (ref 0.7–1.2)
EOSINOPHILS ABSOLUTE: 0.16 K/UL (ref 0.05–0.5)
EOSINOPHILS RELATIVE PERCENT: 3 % (ref 0–6)
GFR, ESTIMATED: >90 ML/MIN/1.73M2
GLUCOSE BLD-MCNC: 89 MG/DL (ref 74–99)
HCT VFR BLD CALC: 54.4 % (ref 37–54)
HDLC SERPL-MCNC: 40 MG/DL
HEMOGLOBIN: 17.7 G/DL (ref 12.5–16.5)
IMMATURE GRANULOCYTES %: 0 % (ref 0–5)
IMMATURE GRANULOCYTES ABSOLUTE: <0.03 K/UL (ref 0–0.58)
LDL CHOLESTEROL: 140 MG/DL
LYMPHOCYTES ABSOLUTE: 1.3 K/UL (ref 1.5–4)
LYMPHOCYTES RELATIVE PERCENT: 22 % (ref 20–42)
MCH RBC QN AUTO: 32.1 PG (ref 26–35)
MCHC RBC AUTO-ENTMCNC: 32.5 G/DL (ref 32–34.5)
MCV RBC AUTO: 98.7 FL (ref 80–99.9)
MONOCYTES ABSOLUTE: 0.68 K/UL (ref 0.1–0.95)
MONOCYTES RELATIVE PERCENT: 12 % (ref 2–12)
NEUTROPHILS ABSOLUTE: 3.67 K/UL (ref 1.8–7.3)
NEUTROPHILS RELATIVE PERCENT: 63 % (ref 43–80)
PDW BLD-RTO: 13.7 % (ref 11.5–15)
PLATELET # BLD: 171 K/UL (ref 130–450)
PMV BLD AUTO: 11.9 FL (ref 7–12)
POTASSIUM SERPL-SCNC: 4.6 MMOL/L (ref 3.5–5)
PROSTATE SPECIFIC ANTIGEN: 0.97 NG/ML (ref 0–4)
RBC # BLD: 5.51 M/UL (ref 3.8–5.8)
SODIUM BLD-SCNC: 138 MMOL/L (ref 132–146)
TOTAL PROTEIN: 7.2 G/DL (ref 6.4–8.3)
TRIGL SERPL-MCNC: 176 MG/DL
VLDLC SERPL CALC-MCNC: 35 MG/DL
WBC # BLD: 5.9 K/UL (ref 4.5–11.5)

## 2024-05-13 PROCEDURE — 99396 PREV VISIT EST AGE 40-64: CPT | Performed by: FAMILY MEDICINE

## 2024-05-13 RX ORDER — TRAMADOL HYDROCHLORIDE 50 MG/1
50 TABLET ORAL EVERY 6 HOURS PRN
Qty: 28 TABLET | Refills: 0 | Status: SHIPPED | OUTPATIENT
Start: 2024-05-13 | End: 2024-05-20

## 2024-05-13 RX ORDER — IBUPROFEN 600 MG/1
600 TABLET ORAL 3 TIMES DAILY PRN
Qty: 30 TABLET | Refills: 2 | Status: SHIPPED | OUTPATIENT
Start: 2024-05-13

## 2024-05-13 SDOH — ECONOMIC STABILITY: FOOD INSECURITY: WITHIN THE PAST 12 MONTHS, YOU WORRIED THAT YOUR FOOD WOULD RUN OUT BEFORE YOU GOT MONEY TO BUY MORE.: NEVER TRUE

## 2024-05-13 SDOH — ECONOMIC STABILITY: FOOD INSECURITY: WITHIN THE PAST 12 MONTHS, THE FOOD YOU BOUGHT JUST DIDN'T LAST AND YOU DIDN'T HAVE MONEY TO GET MORE.: NEVER TRUE

## 2024-05-13 SDOH — ECONOMIC STABILITY: INCOME INSECURITY: HOW HARD IS IT FOR YOU TO PAY FOR THE VERY BASICS LIKE FOOD, HOUSING, MEDICAL CARE, AND HEATING?: NOT HARD AT ALL

## 2024-05-13 SDOH — ECONOMIC STABILITY: TRANSPORTATION INSECURITY
IN THE PAST 12 MONTHS, HAS LACK OF TRANSPORTATION KEPT YOU FROM MEETINGS, WORK, OR FROM GETTING THINGS NEEDED FOR DAILY LIVING?: NO

## 2024-05-13 ASSESSMENT — ANXIETY QUESTIONNAIRES
3. WORRYING TOO MUCH ABOUT DIFFERENT THINGS: NOT AT ALL
5. BEING SO RESTLESS THAT IT IS HARD TO SIT STILL: NOT AT ALL
5. BEING SO RESTLESS THAT IT IS HARD TO SIT STILL: NOT AT ALL
4. TROUBLE RELAXING: NOT AT ALL
6. BECOMING EASILY ANNOYED OR IRRITABLE: NOT AT ALL
2. NOT BEING ABLE TO STOP OR CONTROL WORRYING: NOT AT ALL
3. WORRYING TOO MUCH ABOUT DIFFERENT THINGS: NOT AT ALL
2. NOT BEING ABLE TO STOP OR CONTROL WORRYING: NOT AT ALL
IF YOU CHECKED OFF ANY PROBLEMS ON THIS QUESTIONNAIRE, HOW DIFFICULT HAVE THESE PROBLEMS MADE IT FOR YOU TO DO YOUR WORK, TAKE CARE OF THINGS AT HOME, OR GET ALONG WITH OTHER PEOPLE: NOT DIFFICULT AT ALL
6. BECOMING EASILY ANNOYED OR IRRITABLE: NOT AT ALL
7. FEELING AFRAID AS IF SOMETHING AWFUL MIGHT HAPPEN: NOT AT ALL
1. FEELING NERVOUS, ANXIOUS, OR ON EDGE: NOT AT ALL
1. FEELING NERVOUS, ANXIOUS, OR ON EDGE: NOT AT ALL
IF YOU CHECKED OFF ANY PROBLEMS ON THIS QUESTIONNAIRE, HOW DIFFICULT HAVE THESE PROBLEMS MADE IT FOR YOU TO DO YOUR WORK, TAKE CARE OF THINGS AT HOME, OR GET ALONG WITH OTHER PEOPLE: NOT DIFFICULT AT ALL
GAD7 TOTAL SCORE: 0
7. FEELING AFRAID AS IF SOMETHING AWFUL MIGHT HAPPEN: NOT AT ALL
4. TROUBLE RELAXING: NOT AT ALL

## 2024-05-13 ASSESSMENT — PATIENT HEALTH QUESTIONNAIRE - PHQ9
1. LITTLE INTEREST OR PLEASURE IN DOING THINGS: NOT AT ALL
SUM OF ALL RESPONSES TO PHQ9 QUESTIONS 1 & 2: 0
SUM OF ALL RESPONSES TO PHQ9 QUESTIONS 1 & 2: 0
2. FEELING DOWN, DEPRESSED OR HOPELESS: NOT AT ALL
2. FEELING DOWN, DEPRESSED OR HOPELESS: NOT AT ALL
SUM OF ALL RESPONSES TO PHQ QUESTIONS 1-9: 0
1. LITTLE INTEREST OR PLEASURE IN DOING THINGS: NOT AT ALL
SUM OF ALL RESPONSES TO PHQ QUESTIONS 1-9: 0

## 2024-05-13 ASSESSMENT — LIFESTYLE VARIABLES
HOW OFTEN DO YOU HAVE A DRINK CONTAINING ALCOHOL: 3
HOW MANY STANDARD DRINKS CONTAINING ALCOHOL DO YOU HAVE ON A TYPICAL DAY: 3 OR 4
HOW OFTEN DO YOU HAVE SIX OR MORE DRINKS ON ONE OCCASION: 1
HOW OFTEN DO YOU HAVE A DRINK CONTAINING ALCOHOL: 2-4 TIMES A MONTH
HOW MANY STANDARD DRINKS CONTAINING ALCOHOL DO YOU HAVE ON A TYPICAL DAY: 2

## 2024-05-13 NOTE — PROGRESS NOTES
24  Joe Kendrick : 1965 Sex: male  Age: 59 y.o.      Assessment and Plan:  Joe was seen today for follow-up and annual exam.    Diagnoses and all orders for this visit:    Encounter for general adult medical examination with abnormal findings  -     Hepatitis C Antibody; Future  -     HIV Screen; Future  -     Basic Metabolic Panel; Future  -     Hepatic Function Panel; Future  -     CBC with Auto Differential; Future  -     Lipid Panel; Future  -     PSA Screening; Future  Further recommendations pending results    Elevated hemoglobin (HCC)  -     CBC with Auto Differential; Future    Prostate cancer screening  -     PSA Screening; Future    Chronic bilateral low back pain without sciatica  -     ibuprofen (ADVIL;MOTRIN) 600 MG tablet; Take 1 tablet by mouth 3 times daily as needed for Pain  -     XR LUMBAR SPINE (2-3 VIEWS); Future  -     traMADol (ULTRAM) 50 MG tablet; Take 1 tablet by mouth every 6 hours as needed for Pain for up to 7 days. Max Daily Amount: 200 mg  Tramadol for as needed usage only -really just for his upcoming trip, though I did suggest trying once before he goes to ensure there is no associated nausea or vomiting, or other side effects  Risks/precautions/expectations explicitly reviewed   Pending results of x-rays, we will try to get the MRI set up    Screening for HIV (human immunodeficiency virus)  -     HIV Screen; Future    Need for hepatitis C screening test  -     Hepatitis C Antibody; Future    Chronic bilateral thoracic back pain  -     XR THORACIC SPINE (3 VIEWS); Future    Pure hypercholesterolemia  -     Basic Metabolic Panel; Future  -     Hepatic Function Panel; Future  -     CBC with Auto Differential; Future  -     Lipid Panel; Future  Patient has declined statins thus far      Return in about 6 months (around 2024).        Chief Complaint   Patient presents with    Follow-up    Annual Exam       HPI  Pt here for routine f/u and annual exam    Patient is

## 2024-05-14 LAB
HEPATITIS C ANTIBODY: NONREACTIVE
HIV AG/AB: NONREACTIVE

## 2024-05-17 RX ORDER — ROSUVASTATIN CALCIUM 20 MG/1
20 TABLET, COATED ORAL NIGHTLY
Qty: 30 TABLET | Refills: 2 | Status: SHIPPED | OUTPATIENT
Start: 2024-05-17

## 2024-05-17 NOTE — PROGRESS NOTES
The 10-year ASCVD risk score (Irma MAY, et al., 2019) is: 13.8%    Values used to calculate the score:      Age: 59 years      Sex: Male      Is Non- : No      Diabetic: No      Tobacco smoker: Yes      Systolic Blood Pressure: 110 mmHg      Is BP treated: No      HDL Cholesterol: 40 mg/dL      Total Cholesterol: 215 mg/dL

## 2024-11-13 ENCOUNTER — OFFICE VISIT (OUTPATIENT)
Dept: PRIMARY CARE CLINIC | Age: 59
End: 2024-11-13

## 2024-11-13 VITALS
DIASTOLIC BLOOD PRESSURE: 76 MMHG | SYSTOLIC BLOOD PRESSURE: 122 MMHG | HEART RATE: 68 BPM | WEIGHT: 214 LBS | BODY MASS INDEX: 28.99 KG/M2 | TEMPERATURE: 98.2 F | HEIGHT: 72 IN | RESPIRATION RATE: 16 BRPM | OXYGEN SATURATION: 95 %

## 2024-11-13 DIAGNOSIS — Z72.0 TOBACCO ABUSE: ICD-10-CM

## 2024-11-13 DIAGNOSIS — M25.552 BILATERAL HIP PAIN: Primary | ICD-10-CM

## 2024-11-13 DIAGNOSIS — G89.29 CHRONIC BILATERAL LOW BACK PAIN WITHOUT SCIATICA: ICD-10-CM

## 2024-11-13 DIAGNOSIS — E78.00 PURE HYPERCHOLESTEROLEMIA: ICD-10-CM

## 2024-11-13 DIAGNOSIS — M54.50 CHRONIC BILATERAL LOW BACK PAIN WITHOUT SCIATICA: ICD-10-CM

## 2024-11-13 DIAGNOSIS — M25.551 BILATERAL HIP PAIN: Primary | ICD-10-CM

## 2024-11-13 RX ORDER — NICOTINE 21 MG/24HR
1 PATCH, TRANSDERMAL 24 HOURS TRANSDERMAL DAILY
Qty: 42 PATCH | Refills: 0 | Status: SHIPPED | OUTPATIENT
Start: 2024-11-13 | End: 2024-12-25

## 2024-11-13 RX ORDER — IBUPROFEN 600 MG/1
600 TABLET, FILM COATED ORAL 3 TIMES DAILY PRN
Qty: 30 TABLET | Refills: 2 | Status: CANCELLED | OUTPATIENT
Start: 2024-11-13

## 2024-11-13 RX ORDER — ROSUVASTATIN CALCIUM 20 MG/1
20 TABLET, COATED ORAL NIGHTLY
Qty: 30 TABLET | Refills: 2 | Status: CANCELLED | OUTPATIENT
Start: 2024-11-13

## 2024-11-13 NOTE — PROGRESS NOTES
24  Joe Kendrick : 1965 Sex: male  Age: 59 y.o.      Assessment and Plan:  Joe was seen today for hyperlipidemia.    Diagnoses and all orders for this visit:    Bilateral hip pain  -     XR HIP RIGHT (2-3 VIEWS); Future  -     XR HIP LEFT (2-3 VIEWS); Future  Further recommendations pending results    Chronic bilateral low back pain without sciatica  -     XR LUMBAR SPINE (2-3 VIEWS); Future    Tobacco abuse  -     nicotine (NICODERM CQ) 21 MG/24HR; Place 1 patch onto the skin daily    Pure hypercholesterolemia  Patient admits he is not consistent taking medication.  We did discuss he could use it is much as he possibly remembers, but he would rather just skip it altogether at this time.  Patient does understand his elevated ASCVD risk score.  He is going to work on smoking cessation.      Declines flu shot, PNA shot   Declines lung cancer screening    Return in about 6 months (around 2025).        Chief Complaint   Patient presents with    Hyperlipidemia       HPI  Pt here for routine follow-up    Patient still complaining of some lower back pain  Not really extending up into the thoracic area like before  He is now noting more discomfort in the bilateral hips as well  He gets some sharp stabbing pains, but there is also some numbness and tingling as well  Is not all the time, but seems to be worse especially if he is walking on uneven ground  Patient never did the previously ordered x-ray on the lower back, but is still interested in doing them  He does not feel he needs the thoracic one  Patient does have known degenerative disc disease in the lumbar spine     HLD -  The 10-year ASCVD risk score (Irma MAY, et al., 2019) is: 16.4%    Values used to calculate the score:      Age: 59 years      Sex: Male      Is Non- : No      Diabetic: No      Tobacco smoker: Yes      Systolic Blood Pressure: 122 mmHg      Is BP treated: No      HDL Cholesterol: 40 mg/dL      Total

## 2024-11-18 DIAGNOSIS — M54.50 CHRONIC BILATERAL LOW BACK PAIN WITHOUT SCIATICA: ICD-10-CM

## 2024-11-18 DIAGNOSIS — M25.551 BILATERAL HIP PAIN: ICD-10-CM

## 2024-11-18 DIAGNOSIS — G89.29 CHRONIC BILATERAL LOW BACK PAIN WITHOUT SCIATICA: ICD-10-CM

## 2024-11-18 DIAGNOSIS — M25.552 BILATERAL HIP PAIN: ICD-10-CM

## 2024-11-18 DIAGNOSIS — G89.29 CHRONIC BILATERAL THORACIC BACK PAIN: ICD-10-CM

## 2024-11-18 DIAGNOSIS — M54.6 CHRONIC BILATERAL THORACIC BACK PAIN: ICD-10-CM

## 2024-11-22 ENCOUNTER — TELEPHONE (OUTPATIENT)
Dept: PRIMARY CARE CLINIC | Age: 59
End: 2024-11-22

## 2024-11-22 DIAGNOSIS — M25.551 BILATERAL HIP PAIN: Primary | ICD-10-CM

## 2024-11-22 DIAGNOSIS — M25.552 BILATERAL HIP PAIN: Primary | ICD-10-CM

## 2025-03-11 ENCOUNTER — OFFICE VISIT (OUTPATIENT)
Dept: ORTHOPEDIC SURGERY | Age: 60
End: 2025-03-11
Payer: COMMERCIAL

## 2025-03-11 VITALS
TEMPERATURE: 98.1 F | OXYGEN SATURATION: 97 % | DIASTOLIC BLOOD PRESSURE: 80 MMHG | RESPIRATION RATE: 16 BRPM | SYSTOLIC BLOOD PRESSURE: 140 MMHG | HEART RATE: 74 BPM

## 2025-03-11 DIAGNOSIS — M16.11 PRIMARY OSTEOARTHRITIS OF RIGHT HIP: ICD-10-CM

## 2025-03-11 DIAGNOSIS — M25.552 LEFT HIP PAIN: ICD-10-CM

## 2025-03-11 DIAGNOSIS — M54.32 BILATERAL SCIATICA: ICD-10-CM

## 2025-03-11 DIAGNOSIS — M25.551 RIGHT HIP PAIN: Primary | ICD-10-CM

## 2025-03-11 DIAGNOSIS — M54.31 BILATERAL SCIATICA: ICD-10-CM

## 2025-03-11 PROCEDURE — 99203 OFFICE O/P NEW LOW 30 MIN: CPT | Performed by: STUDENT IN AN ORGANIZED HEALTH CARE EDUCATION/TRAINING PROGRAM

## 2025-03-11 NOTE — PROGRESS NOTES
[facility]   ORTHOPAEDIC SURGERY   DATE OF VISIT: 03/11/25  New Hip Patient     Referring Provider:   Rossy Reyes MD  [address] [place] [address]    CHIEF COMPLAINT:   Chief Complaint   Patient presents with    Follow-up     Bl hip pain, been hurting for awhile but getting worse        HPI:      History of Present Illness  The patient presents for evaluation of bilateral hip pain.    He has been experiencing persistent discomfort in his hips for several years, which he has been managing through ambulation. As a , he is frequently on his feet, often walking on flat concrete surfaces without significant issues. However, he experiences pain when descending hills, necessitating frequent rest breaks. He reports numbness in his left leg, with no discernible difference in the severity of pain between the right and left sides. He also reports unusual sensations in his legs during morning walks across parking lots and into stores, describing a lack of control over his legs. He expresses concern about the potential impact of his symptoms on his ability to hike post-shelter. Despite being prescribed ibuprofen 600 mg, he finds it ineffective in alleviating his symptoms.    He is uncertain if these symptoms are related to his back condition. He was previously informed that he has no remaining cartilage in his back. He recalls a fall on his back that resulted in a year-long period of pain. He underwent disc replacement surgery in 1995 or 1996.    SOCIAL HISTORY  He works as a .    MEDICATIONS  Current: ibuprofen 600 mg      PAST MEDICAL HISTORY  History reviewed. No pertinent past medical history.    PAST SURGICAL HISTORY  History reviewed. No pertinent surgical history.    FAMILY HISTORY   History reviewed. No pertinent family history.    SOCIAL HISTORY  Social History     Occupational History    Not on file   Tobacco Use    Smoking status: Every Day     Current packs/day: 1.00

## 2025-05-12 ENCOUNTER — HOSPITAL ENCOUNTER (OUTPATIENT)
Dept: GENERAL RADIOLOGY | Age: 60
Discharge: HOME | End: 2025-05-14
Payer: COMMERCIAL

## 2025-05-12 ENCOUNTER — OFFICE VISIT (OUTPATIENT)
Dept: PHYSICAL MEDICINE AND REHAB | Age: 60
End: 2025-05-12
Payer: COMMERCIAL

## 2025-05-12 VITALS
HEIGHT: 72 IN | WEIGHT: 214 LBS | HEART RATE: 77 BPM | DIASTOLIC BLOOD PRESSURE: 83 MMHG | SYSTOLIC BLOOD PRESSURE: 138 MMHG | BODY MASS INDEX: 28.99 KG/M2

## 2025-05-12 DIAGNOSIS — M51.362 DEGENERATION OF INTERVERTEBRAL DISC OF LUMBAR REGION WITH DISCOGENIC BACK PAIN AND LOWER EXTREMITY PAIN: ICD-10-CM

## 2025-05-12 DIAGNOSIS — M54.41 CHRONIC BILATERAL LOW BACK PAIN WITH BILATERAL SCIATICA: ICD-10-CM

## 2025-05-12 DIAGNOSIS — M54.42 CHRONIC BILATERAL LOW BACK PAIN WITH BILATERAL SCIATICA: ICD-10-CM

## 2025-05-12 DIAGNOSIS — G89.29 CHRONIC BILATERAL LOW BACK PAIN WITH BILATERAL SCIATICA: ICD-10-CM

## 2025-05-12 DIAGNOSIS — M54.16 LUMBAR RADICULITIS: Primary | ICD-10-CM

## 2025-05-12 DIAGNOSIS — M54.16 LUMBAR RADICULITIS: ICD-10-CM

## 2025-05-12 PROCEDURE — 99204 OFFICE O/P NEW MOD 45 MIN: CPT | Performed by: STUDENT IN AN ORGANIZED HEALTH CARE EDUCATION/TRAINING PROGRAM

## 2025-05-12 PROCEDURE — 72100 X-RAY EXAM L-S SPINE 2/3 VWS: CPT

## 2025-05-12 NOTE — PROGRESS NOTES
Tanya Gil MD  Physical Medicine & Rehabilitation  [address] [facility] [address]  [phone]     Referred by:  Dr. Tena    PCP: Rossy Reyes MD (General)    Reason for referral: sciatica    History of Present Illness:  Joe Kendrick is a 60 y.o. male with a history of  has no past medical history on file. presenting today for evaluation of his chief complaint:  back pain.  External notes/medical records independently reviewed and pertinent information found was incorporated.  History of Present Illness  The patient presents for evaluation of back pain. He is accompanied by his wife.    He was referred by Dr. Tena due to persistent back pain, which has been ongoing for approximately 4 to 5 years. He reports severe hip pain and leg numbness after walking short distances of 20 to 40 yards. A previous consultation with an orthopedic specialist revealed no hip-related issues but identified problems at the L4-L5 level. He reports that x-rays taken 2 to 3 years ago indicated cartilage loss. He also experiences groin pain but no numbness. He reports no recent falls but acknowledges leg weakness. His symptoms are alleviated by rest. He has not undergone physical therapy in several years. He maintains an active lifestyle at home and reports that his legs become numb when he lies on his back in bed. He reports no neck pain or hand numbness or tingling. He has a history of heavy alcohol consumption, drinking 3 times a week for 20 years, but has since reduced this to once a week. He is not currently on any medications. He has been prescribed ibuprofen 600 mg for pain management, which he takes sparingly, approximately once a month. He underwent back surgery in the late 1990s, which involved a reported disc replacement. He had a consultation with a neurosurgeon a few years ago, who ordered an MRI and EMG, but these tests were not completed.  He also reports a history of hemorrhoids and occasional stool

## 2025-05-13 ENCOUNTER — OFFICE VISIT (OUTPATIENT)
Dept: PRIMARY CARE CLINIC | Age: 60
End: 2025-05-13

## 2025-05-13 VITALS
HEART RATE: 74 BPM | OXYGEN SATURATION: 98 % | HEIGHT: 72 IN | DIASTOLIC BLOOD PRESSURE: 80 MMHG | SYSTOLIC BLOOD PRESSURE: 124 MMHG | BODY MASS INDEX: 29.26 KG/M2 | WEIGHT: 216 LBS | RESPIRATION RATE: 16 BRPM

## 2025-05-13 DIAGNOSIS — D58.2 ELEVATED HEMOGLOBIN: ICD-10-CM

## 2025-05-13 DIAGNOSIS — Z12.5 PROSTATE CANCER SCREENING: ICD-10-CM

## 2025-05-13 DIAGNOSIS — E78.00 PURE HYPERCHOLESTEROLEMIA: Primary | ICD-10-CM

## 2025-05-13 DIAGNOSIS — R53.82 CHRONIC FATIGUE: ICD-10-CM

## 2025-05-13 SDOH — ECONOMIC STABILITY: FOOD INSECURITY: WITHIN THE PAST 12 MONTHS, YOU WORRIED THAT YOUR FOOD WOULD RUN OUT BEFORE YOU GOT MONEY TO BUY MORE.: NEVER TRUE

## 2025-05-13 SDOH — ECONOMIC STABILITY: FOOD INSECURITY: WITHIN THE PAST 12 MONTHS, THE FOOD YOU BOUGHT JUST DIDN'T LAST AND YOU DIDN'T HAVE MONEY TO GET MORE.: NEVER TRUE

## 2025-05-13 ASSESSMENT — PATIENT HEALTH QUESTIONNAIRE - PHQ9
2. FEELING DOWN, DEPRESSED OR HOPELESS: NOT AT ALL
1. LITTLE INTEREST OR PLEASURE IN DOING THINGS: NOT AT ALL
SUM OF ALL RESPONSES TO PHQ QUESTIONS 1-9: 0

## 2025-05-13 NOTE — PROGRESS NOTES
25  Joe Kendrick : 1965 Sex: male  Age: 60 y.o.      Assessment and Plan:  Joe was seen today for hyperlipidemia.    Diagnoses and all orders for this visit:    Pure hypercholesterolemia  -     Basic Metabolic Panel; Future  -     Hepatic Function Panel; Future  -     CBC with Auto Differential; Future  -     Lipid Panel; Future  Further recommendations pending results    Elevated hemoglobin  -     CBC with Auto Differential; Future    Prostate cancer screening  -     PSA Screening; Future    Chronic fatigue  -     TSH; Future  -     Vitamin B12; Future  -     Testosterone Free & Bio, Total; Future  Consideration for sleep study moving forward if blood work reassuring    Declines PNA shot   Declines both CRC and lung cancer screening at this time     Return in about 6 months (around 2025).        Chief Complaint   Patient presents with    Hyperlipidemia       HPI  Pt here for routine follow-up  Generally doing well, no acute concerns or complaints for me today outside of some fatigue, see below    HLD -  The 10-year ASCVD risk score (Irma MAY, et al., 2019) is: 17.5%    Values used to calculate the score:      Age: 60 years      Sex: Male      Is Non- : No      Diabetic: No      Tobacco smoker: Yes      Systolic Blood Pressure: 124 mmHg      Is BP treated: No      HDL Cholesterol: 40 mg/dL      Total Cholesterol: 215 mg/dL  Has opted not to take statin at this time     He has noted increased fatigue recently  X 2-3 months   Sleep has been ok for the most part but feels tired as soon as he wakes up  Does snore some   Also increased episodes of redness in his face  He has no associated symptoms from this including a sensation of heat or tingling  Typically will last half a day or even sometimes all day  Does not seem to be related to anything he is doing    Problem list reviewed and updated in full with patient today as necessary.  A comprehensive ROS was negative,

## 2025-06-06 ENCOUNTER — HOSPITAL ENCOUNTER (OUTPATIENT)
Dept: MRI IMAGING | Age: 60
Discharge: HOME | End: 2025-06-08
Attending: STUDENT IN AN ORGANIZED HEALTH CARE EDUCATION/TRAINING PROGRAM
Payer: COMMERCIAL

## 2025-06-06 DIAGNOSIS — G89.29 CHRONIC BILATERAL LOW BACK PAIN WITH BILATERAL SCIATICA: ICD-10-CM

## 2025-06-06 DIAGNOSIS — M51.362 DEGENERATION OF INTERVERTEBRAL DISC OF LUMBAR REGION WITH DISCOGENIC BACK PAIN AND LOWER EXTREMITY PAIN: ICD-10-CM

## 2025-06-06 DIAGNOSIS — M54.16 LUMBAR RADICULITIS: ICD-10-CM

## 2025-06-06 DIAGNOSIS — M54.42 CHRONIC BILATERAL LOW BACK PAIN WITH BILATERAL SCIATICA: ICD-10-CM

## 2025-06-06 DIAGNOSIS — M54.41 CHRONIC BILATERAL LOW BACK PAIN WITH BILATERAL SCIATICA: ICD-10-CM

## 2025-06-06 PROCEDURE — 72148 MRI LUMBAR SPINE W/O DYE: CPT

## 2025-06-10 ENCOUNTER — RESULTS FOLLOW-UP (OUTPATIENT)
Dept: PHYSICAL MEDICINE AND REHAB | Age: 60
End: 2025-06-10

## 2025-06-18 ENCOUNTER — OFFICE VISIT (OUTPATIENT)
Age: 60
End: 2025-06-18
Payer: COMMERCIAL

## 2025-06-18 VITALS
WEIGHT: 216 LBS | BODY MASS INDEX: 29.26 KG/M2 | HEIGHT: 72 IN | DIASTOLIC BLOOD PRESSURE: 74 MMHG | SYSTOLIC BLOOD PRESSURE: 126 MMHG | HEART RATE: 84 BPM

## 2025-06-18 DIAGNOSIS — G89.29 CHRONIC BILATERAL LOW BACK PAIN WITH BILATERAL SCIATICA: ICD-10-CM

## 2025-06-18 DIAGNOSIS — M48.062 LUMBAR STENOSIS WITH NEUROGENIC CLAUDICATION: Primary | ICD-10-CM

## 2025-06-18 DIAGNOSIS — M54.42 CHRONIC BILATERAL LOW BACK PAIN WITH BILATERAL SCIATICA: ICD-10-CM

## 2025-06-18 DIAGNOSIS — M54.41 CHRONIC BILATERAL LOW BACK PAIN WITH BILATERAL SCIATICA: ICD-10-CM

## 2025-06-18 DIAGNOSIS — M51.362 DEGENERATION OF INTERVERTEBRAL DISC OF LUMBAR REGION WITH DISCOGENIC BACK PAIN AND LOWER EXTREMITY PAIN: ICD-10-CM

## 2025-06-18 PROCEDURE — 99214 OFFICE O/P EST MOD 30 MIN: CPT | Performed by: STUDENT IN AN ORGANIZED HEALTH CARE EDUCATION/TRAINING PROGRAM

## 2025-06-18 RX ORDER — GABAPENTIN 300 MG/1
CAPSULE ORAL
Qty: 49 CAPSULE | Refills: 0 | Status: SHIPPED | OUTPATIENT
Start: 2025-06-18 | End: 2025-07-18

## 2025-06-18 NOTE — PROGRESS NOTES
Tanya Gil MD  Physical Medicine & Rehabilitation  [address] [place] [id number]  [phone]     6/18/25    Chief Complaint   Patient presents with    Back Pain     Bilateral leg numbness from the low back.  MRI results and xr results to go over       HPI:  Joe Kendrick is a 60 y.o. year old man seen today in follow up regarding back pain and imaging findings.     History of Present Illness  The patient is a 60-year-old male who presents for evaluation of back pain.    He reports persistent back pain, accompanied by numbness in his legs during prolonged activities. The pain is particularly severe in his hips, leading him to suspect an underlying issue in that area. He also experiences groin pain that radiates downwards, especially when walking. His calves feel numb, but he does not experience any pain in this region. He has not undergone any physical therapy or formal treatment for his condition. His mobility is significantly limited, with severe pain preventing him from walking more than 40 yards. He recalls a previous incident where he was unable to stand or walk due to back pain, which he likened to a slipped disc. This episode lasted for 2 to 3 weeks. He has attempted some exercises, which provided temporary relief, but did not improve his walking ability. He works on concrete floors, which exacerbates his pain, and he often needs to sit down after walking from his mailbox to his house. He is open to trying medication and therapy but is hesitant about surgery. He has not received any injections in the hip as his doctor did not deem it necessary.      History reviewed. No pertinent past medical history.    History reviewed. No pertinent surgical history.      History reviewed. No pertinent family history.    Current Outpatient Medications   Medication Sig Dispense Refill    gabapentin (NEURONTIN) 300 MG capsule Take 1 capsule by mouth nightly for 7 days, then take 1 capsule twice daily for 3 weeks.